# Patient Record
Sex: FEMALE | Race: WHITE | NOT HISPANIC OR LATINO | URBAN - NONMETROPOLITAN AREA
[De-identification: names, ages, dates, MRNs, and addresses within clinical notes are randomized per-mention and may not be internally consistent; named-entity substitution may affect disease eponyms.]

---

## 2019-02-12 NOTE — PROCEDURE NOTE: SURGICAL
"<span style=""font-weight:bold;"">MR #:</span> [id number]<XO /><br /><span style=""font-weight:bold;"">PREOPERATIVE DIAGNOSIS:</span> Cataract

## 2019-02-13 NOTE — PATIENT DISCUSSION
ok to proceed with IOL OD due to FD.  Dec for Sx OD due to FD. Goal is for Basic OD, goal of emmetropia.  ed may need glasses at [facility] - [facility] MENDO[facility] in the Sx package for personal BCVA.

## 2019-02-19 NOTE — PATIENT DISCUSSION
Cataract surgery has been performed in the first eye and activities of daily living are still impaired. The patient would like to proceed with cataract surgery in the second eye as scheduled. The patient elects Basic OD, goal of emmetropia.

## 2019-02-19 NOTE — PROCEDURE NOTE: SURGICAL
"<span style=""font-weight:bold;"">MR #:</span> [id number]<DV /><br /><span style=""font-weight:bold;"">PREOPERATIVE DIAGNOSIS:</span> Cataract

## 2019-03-13 NOTE — PATIENT DISCUSSION
3 weeks PO: Patient is doing well post-operatively. The importance of post-op drop compliance was emphasized. Drop schedule reviewed with patient. Patient to call if any visual changes or concerns.

## 2020-08-06 ENCOUNTER — IMPORTED ENCOUNTER (OUTPATIENT)
Dept: URBAN - NONMETROPOLITAN AREA CLINIC 1 | Facility: CLINIC | Age: 35
End: 2020-08-06

## 2020-08-06 PROBLEM — H52.223: Noted: 2020-08-06

## 2020-08-06 PROBLEM — H52.03: Noted: 2020-08-06

## 2020-08-06 PROCEDURE — 92310 CONTACT LENS FITTING OU: CPT

## 2020-08-06 PROCEDURE — 92015 DETERMINE REFRACTIVE STATE: CPT

## 2020-08-06 PROCEDURE — 92004 COMPRE OPH EXAM NEW PT 1/>: CPT

## 2020-08-06 NOTE — PATIENT DISCUSSION
Mixed Astigmatism OD/Compound Hyperopic Astigmatism OS-  discussed findings w/patient-  new spectacle Rx issued-  CL fitting initiated-  RTC 2 weeks CL check or prn; 's Notes: MR 8/6/2020DFE 8/6/2020

## 2020-10-01 ENCOUNTER — IMPORTED ENCOUNTER (OUTPATIENT)
Dept: URBAN - NONMETROPOLITAN AREA CLINIC 1 | Facility: CLINIC | Age: 35
End: 2020-10-01

## 2020-10-01 NOTE — PATIENT DISCUSSION
CL Check-  discussed findings w/patient-  new CL Rx issued-  continue to monitor yearly or prn; 's Notes: MR 8/6/2020DFE 8/6/2020

## 2020-10-19 NOTE — PATIENT DISCUSSION
Recommended observation.  ed will limit BCVA OD and OS will be clearer eye due to this being present.

## 2020-10-27 NOTE — PROCEDURE NOTE: SURGICAL
<p>Prior to commencing surgery patient identification, surgical procedure, site, and side were confirmed by Dr. Hmapton.&nbsp; Following topical proparacaine anesthesia, the patient was positioned at the YAG laser, a contact lens coupled to the cornea of the right eye with methylcellulose and an axial posterior capsulotomy performed without complication using 3.2 Mj x 14. Attention was then turned to the left eye and a contact lens coupled to the cornea of the left eye with methylcellulose and an axial posterior capsulotomy performed without complication using 3.4 Mj x 13. One drop of Alphagan was instilled in both eyes and the patient returned to the holding area having tolerated the procedure well and without complication. </p><p>MRN:[id number]</p>

## 2020-11-04 NOTE — PATIENT DISCUSSION
Well healed.  spec Rx given but full ed since has ERM OD will NOT be as clear as OS with ANY glasses.

## 2021-07-22 ENCOUNTER — IMPORTED ENCOUNTER (OUTPATIENT)
Dept: URBAN - NONMETROPOLITAN AREA CLINIC 1 | Facility: CLINIC | Age: 36
End: 2021-07-22

## 2021-07-22 PROBLEM — H52.03: Noted: 2021-07-22

## 2021-07-22 PROBLEM — G43.B0: Noted: 2021-07-22

## 2021-07-22 PROBLEM — H52.223: Noted: 2021-07-22

## 2021-07-22 PROCEDURE — 99214 OFFICE O/P EST MOD 30 MIN: CPT

## 2021-07-22 NOTE — PATIENT DISCUSSION
Ophthalmic Migraine-  discussed findings w/patient-  migraine handout given-  no treatment indicated at this time-  continue to monitor as scheduled or prn; 's Notes: MR 8/6/2020DFE 7/22/2021 OD Only LLE: minimal edema, ttp to lateral aspect of L ankle foot at and inferior to lateral malleolus. FROM ankle without limitation, 2+ DP pulse cap refill <2 sec, sensation grossly intact. without ttp or limited ROM proximal to injury.

## 2021-10-05 ENCOUNTER — IMPORTED ENCOUNTER (OUTPATIENT)
Dept: URBAN - NONMETROPOLITAN AREA CLINIC 1 | Facility: CLINIC | Age: 36
End: 2021-10-05

## 2021-10-05 PROBLEM — H52.03: Noted: 2021-10-05

## 2021-10-05 PROBLEM — G43.B0: Noted: 2021-10-05

## 2021-10-05 PROBLEM — H52.223: Noted: 2021-10-05

## 2021-10-05 PROCEDURE — V2521 CNTCT LENS HYDROPHILIC TORIC: HCPCS

## 2021-10-05 PROCEDURE — 92310 CONTACT LENS FITTING OU: CPT

## 2021-10-05 PROCEDURE — 92014 COMPRE OPH EXAM EST PT 1/>: CPT

## 2021-10-05 PROCEDURE — 92015 DETERMINE REFRACTIVE STATE: CPT

## 2021-10-05 NOTE — PATIENT DISCUSSION
Mixed Astigmatism OD/Compound Hyperopic Astigmatism OS w/Presbyopia-  discussed findings w/patient-  new spectacle/CL Rx issued-  continue to monitor yearly or prnOphthalmic Migraine-  discussed findings w/patient-  stable findings at this time-  no treatment indicated at this time-  continue to monitor as scheduled or prn; 's Notes: MR 10/5/2021DFE Deferred 10/5/2021

## 2021-10-26 NOTE — PATIENT DISCUSSION
Medication(s) Requested: Vyvanse  Last office visit: 6/17/21  Scheduled appt.None  Last Dispensed 10/5/21  Is the patient due for refill of this medication(s): Yes  PDMP review: Criteria met. Forwarded to Physician/GIAN for signature.    Patient understands condition, prognosis and need for follow up care.

## 2022-04-10 ASSESSMENT — TONOMETRY
OD_IOP_MMHG: 14
OD_IOP_MMHG: 14
OD_IOP_MMHG: 13
OS_IOP_MMHG: 12
OS_IOP_MMHG: 14
OS_IOP_MMHG: 13

## 2022-04-10 ASSESSMENT — VISUAL ACUITY
OD_SC: 20/20
OU_SC: 20/20
OS_SC: 20/20
OU_CC: 20/20
OU_SC: 20/20
OS_SC: 20/20
OD_SC: 20/20
OD_CC: 20/30-1
OS_SC: 20/20
OU_CC: 20/20
OS_CC: 20/25-2
OD_SC: 20/20

## 2022-04-10 ASSESSMENT — KERATOMETRY
OS_AXISANGLE_DEGREES: 177
OS_K2POWER_DIOPTERS: 47.00
OS_K1POWER_DIOPTERS: 44.75
OD_K1POWER_DIOPTERS: 45.00
OD_AXISANGLE_DEGREES: 180
OD_K2POWER_DIOPTERS: 46.75